# Patient Record
Sex: MALE | Race: WHITE | URBAN - METROPOLITAN AREA
[De-identification: names, ages, dates, MRNs, and addresses within clinical notes are randomized per-mention and may not be internally consistent; named-entity substitution may affect disease eponyms.]

---

## 2021-07-02 ENCOUNTER — THERAPY VISIT (OUTPATIENT)
Dept: PHYSICAL THERAPY | Facility: CLINIC | Age: 41
End: 2021-07-02
Payer: OTHER MISCELLANEOUS

## 2021-07-02 DIAGNOSIS — M79.672 PAIN OF LEFT HEEL: ICD-10-CM

## 2021-07-02 PROCEDURE — 97161 PT EVAL LOW COMPLEX 20 MIN: CPT | Mod: GP | Performed by: PHYSICAL THERAPIST

## 2021-07-02 PROCEDURE — 97035 APP MDLTY 1+ULTRASOUND EA 15: CPT | Mod: GP | Performed by: PHYSICAL THERAPIST

## 2021-07-02 PROCEDURE — 97110 THERAPEUTIC EXERCISES: CPT | Mod: GP | Performed by: PHYSICAL THERAPIST

## 2021-07-02 NOTE — LETTER
[facility] [place]  [address] [address]  [address]  [place] [address]  [phone]    2021    Re: Fernando Good   :   1980  MRN:  [id number]   REFERRING PHYSICIAN:   Arthur BAPTISTE [facility] [place]    Date of Initial Evaluation:  2021  Visits:  Rxs Used: 1  Reason for Referral:  Pain of left heel    EVALUATION SUMMARY    Physical Therapy Initial Evaluation  Subjective:  The history is provided by the patient.   Therapist Generated HPI Evaluation  Problem details: Ladder was falling so he jumped off and took all weight on L heel.  DOI 2021.         Type of problem:  Left foot.  This is a new condition.  Condition occurred with:  A fall/slip.  Where condition occurred: at work.  Patient reports pain:  Sub calcaneus.  Pain is described as aching and sharp and is constant.  Pain radiates to:  Foot and lower leg. Pain is worse during the day and worse in the P.M..    Associated symptoms:  Edema and loss of motion/stiffness. Symptoms are exacerbated by ascending stairs, descending stairs, standing, walking and weight bearing  and relieved by ice.  Special tests included:  X-ray (no fracture).  Restrictions due to condition include:  Working in normal job with restrictions.    Patient Health History  Fernando Good being seen for L heel/ foot stiffness.   Problem began: 2021.   Problem occurred: landed on heel   Pain is reported as 1/10 on pain scale.  General health as reported by patient is fair.  Pertinent medical history includes: high blood pressure and overweight.   Red flags:  None as reported by patient.  Medical allergies: none.   Current medications:  High blood pressure medication.    Current occupation is .   Primary job tasks include:  Prolonged standing and lifting/carrying.   Re: Fernando Good   :   1980                       Objective:    Gait:  Was given boot to  wear also but it actually increased pain in foot.  Arrives with crutches and using only toe WB on L foot.      Ankle/Foot Evaluation  ROM:  AROM is normal (slight end range sx with DF).    Strength is normal.  LIGAMENT TESTING: normal    SPECIAL TESTS: normal    PALPATION: Palpation of ankle: subcalcaneal on L     EDEMA: normal (reports swelling at end of day)    MOBILITY TESTING: normal    General     ROS    Assessment/Plan:    Patient is a 41 year old male with L heel complaints.    Patient has the following significant findings with corresponding treatment plan.                Diagnosis 1:  L heel pain  Pain -  US, manual therapy, self management, education, directional preference exercise and home program  Decreased ROM/flexibility - manual therapy and therapeutic exercise  Impaired gait - gait training  Impaired muscle performance - neuro re-education  Decreased function - therapeutic activities    Therapy Evaluation Codes:   1) History comprised of:   Personal factors that impact the plan of care:      None.    Comorbidity factors that impact the plan of care are:      High blood pressure and Overweight.     Medications impacting care: High blood pressure.  2) Examination of Body Systems comprised of:   Body structures and functions that impact the plan of care:      heel.   Activity limitations that impact the plan of care are:      Walking.  3) Clinical presentation characteristics are:   Stable/Uncomplicated.  4) Decision-Making    Low complexity using standardized patient assessment instrument and/or measureable assessment of functional outcome.  Cumulative Therapy Evaluation is: Low complexity.  Re: Fernando Good   :   1980    Previous and current functional limitations:  (See Goal Flow Sheet for this information)    Short term and Long term goals: (See Goal Flow Sheet for this information)     Communication ability:  Patient appears to be able to clearly communicate and understand verbal  and written communication and follow directions correctly.  Treatment Explanation - The following has been discussed with the patient:   RX ordered/plan of care  Anticipated outcomes  Possible risks and side effects  This patient would benefit from PT intervention to resume normal activities.   Rehab potential is good.    Frequency:  1 X week, once daily  Duration:  for 6 weeks  Discharge Plan:  Achieve all LTG.  Independent in home treatment program.  Reach maximal therapeutic benefit.    Thank you for your referral.    INQUIRIES  Therapist: Chung Blanco PT   [address] [address]  Phone: [phone]  Fax: [phone]

## 2021-07-02 NOTE — PROGRESS NOTES
Physical Therapy Initial Evaluation  Subjective:  The history is provided by the patient.   Therapist Generated HPI Evaluation  Problem details: Ladder was falling so he jumped off and took all weight on L heel.  DOI 6/24/2021.         Type of problem:  Left foot.    This is a new condition.  Condition occurred with:  A fall/slip.  Where condition occurred: at work.  Patient reports pain:  Sub calcaneus.  Pain is described as aching and sharp and is constant.  Pain radiates to:  Foot and lower leg. Pain is worse during the day and worse in the P.M..    Associated symptoms:  Edema and loss of motion/stiffness. Symptoms are exacerbated by ascending stairs, descending stairs, standing, walking and weight bearing  and relieved by ice.  Special tests included:  X-ray (no fracture).    Restrictions due to condition include:  Working in normal job with restrictions.      Patient Health History  Fernando Good being seen for L heel/ foot stiffness.     Problem began: 6/24/2021.   Problem occurred: landed on heel   Pain is reported as 1/10 on pain scale.  General health as reported by patient is fair.  Pertinent medical history includes: high blood pressure and overweight.   Red flags:  None as reported by patient.  Medical allergies: none.       Current medications:  High blood pressure medication.    Current occupation is .   Primary job tasks include:  Prolonged standing and lifting/carrying.                                    Objective:    Gait:  Was given boot to wear also but it actually increased pain in foot.  Arrives with crutches and using only toe WB on L foot.                Ankle/Foot Evaluation  ROM:  AROM is normal (slight end range sx with DF).      Strength is normal.  LIGAMENT TESTING: normal              SPECIAL TESTS: normal    PALPATION: Palpation of ankle: subcalcaneal on L     EDEMA: normal (reports swelling at end of day)          MOBILITY TESTING: normal                                                                 General     ROS    Assessment/Plan:    Patient is a 41 year old male with L heel complaints.    Patient has the following significant findings with corresponding treatment plan.                Diagnosis 1:  L heel pain  Pain -  US, manual therapy, self management, education, directional preference exercise and home program  Decreased ROM/flexibility - manual therapy and therapeutic exercise  Impaired gait - gait training  Impaired muscle performance - neuro re-education  Decreased function - therapeutic activities    Therapy Evaluation Codes:   1) History comprised of:   Personal factors that impact the plan of care:      None.    Comorbidity factors that impact the plan of care are:      High blood pressure and Overweight.     Medications impacting care: High blood pressure.  2) Examination of Body Systems comprised of:   Body structures and functions that impact the plan of care:      heel.   Activity limitations that impact the plan of care are:      Walking.  3) Clinical presentation characteristics are:   Stable/Uncomplicated.  4) Decision-Making    Low complexity using standardized patient assessment instrument and/or measureable assessment of functional outcome.  Cumulative Therapy Evaluation is: Low complexity.    Previous and current functional limitations:  (See Goal Flow Sheet for this information)    Short term and Long term goals: (See Goal Flow Sheet for this information)     Communication ability:  Patient appears to be able to clearly communicate and understand verbal and written communication and follow directions correctly.  Treatment Explanation - The following has been discussed with the patient:   RX ordered/plan of care  Anticipated outcomes  Possible risks and side effects  This patient would benefit from PT intervention to resume normal activities.   Rehab potential is good.    Frequency:  1 X week, once daily  Duration:  for 6 weeks  Discharge Plan:   Achieve all LTG.  Independent in home treatment program.  Reach maximal therapeutic benefit.    Please refer to the daily flowsheet for treatment today, total treatment time and time spent performing 1:1 timed codes.

## 2021-07-09 ENCOUNTER — THERAPY VISIT (OUTPATIENT)
Dept: PHYSICAL THERAPY | Facility: CLINIC | Age: 41
End: 2021-07-09
Payer: OTHER MISCELLANEOUS

## 2021-07-09 DIAGNOSIS — M79.672 PAIN OF LEFT HEEL: ICD-10-CM

## 2021-07-09 PROCEDURE — 97140 MANUAL THERAPY 1/> REGIONS: CPT | Mod: GP | Performed by: PHYSICAL THERAPIST

## 2021-07-09 PROCEDURE — 97035 APP MDLTY 1+ULTRASOUND EA 15: CPT | Mod: GP | Performed by: PHYSICAL THERAPIST

## 2021-07-09 PROCEDURE — 97110 THERAPEUTIC EXERCISES: CPT | Mod: GP | Performed by: PHYSICAL THERAPIST

## 2021-07-12 ENCOUNTER — THERAPY VISIT (OUTPATIENT)
Dept: PHYSICAL THERAPY | Facility: CLINIC | Age: 41
End: 2021-07-12
Payer: OTHER MISCELLANEOUS

## 2021-07-12 DIAGNOSIS — M79.672 PAIN OF LEFT HEEL: ICD-10-CM

## 2021-07-12 PROCEDURE — 97140 MANUAL THERAPY 1/> REGIONS: CPT | Mod: GP

## 2021-07-12 PROCEDURE — 97110 THERAPEUTIC EXERCISES: CPT | Mod: GP

## 2021-07-12 NOTE — LETTER
[facility] [place]  [address]  [address]  [place] [address]  [phone]    November 15, 2021    Re: Fernando Good   :   1980  MRN:  [id number]   REFERRING PHYSICIAN:   Arthur BAPTISTE [facility] [place]    Date of Initial Evaluation:  2021  Visits:  Rxs Used: 3  Reason for Referral:  Pain of left heel    EVALUATION SUMMARY    Subjective:  HPI  Physical Exam                  Objective:  System  Physical Exam  General   ROS    Assessment/Plan:    DISCHARGE REPORT    Progress reporting period is from 2021 to 2021.     SUBJECTIVE  Subjective: pt reports he continues to progress working on getting more WB on left heel    Current Pain level: 1/10   Initial Pain level: 1/10   Changes in function: Yes, see goal flow sheet for change in function    ;   ,     Patient has failed to return to therapy so current objective findings are unknown.  The subjective and objective information are from the last SOAP note on this patient.    OBJECTIVE  Objective: progressing with WB with gait, tenderness continues to decrease       ASSESSMENT/PLAN  Updated problem list and treatment plan: Diagnosis 1:  L heel pain  Pain -  US, manual therapy, self management, education, directional preference exercise  Re: Fernando Good   :   1980    and home program  Decreased ROM/flexibility - manual therapy and therapeutic exercise  Impaired gait - gait training  Impaired muscle performance - neuro re-education  Decreased function - therapeutic activities     STG/LTGs have been met or progress has been made towards goals:  unknown  Assessment of Progress: The patient has not returned to therapy. Current status is unknown.  Self Management Plans:  Patient has been instructed in a home treatment program.    Fernando continues to require the following intervention to meet STG and LTG's: PT intervention is no longer required to meet  STG/LTG.  The patient failed to complete scheduled/ordered appointments so current information is unknown.  We will discharge this patient from PT.    Recommendations:  Canceled and failed last two visits.  Discharge pt from PT.      Thank you for your referral.    INQUIRIES  Therapist: Chanel Ye [address]  [facility] [address]  Phone: [phone]  Fax: [phone]

## 2021-11-15 PROBLEM — M79.672 PAIN OF LEFT HEEL: Status: RESOLVED | Noted: 2021-07-02 | Resolved: 2021-11-15

## 2021-11-15 NOTE — PROGRESS NOTES
Subjective:  HPI  Physical Exam                    Objective:  System    Physical Exam    General     ROS    Assessment/Plan:    DISCHARGE REPORT    Progress reporting period is from 7/2/2021 to 7/12/2021.     SUBJECTIVE  Subjective: pt reports he continues to progress working on getting more WB on left heel    Current Pain level: 1/10   Initial Pain level: 1/10   Changes in function: Yes, see goal flow sheet for change in function    ;   ,     Patient has failed to return to therapy so current objective findings are unknown.  The subjective and objective information are from the last SOAP note on this patient.    OBJECTIVE  Objective: progressing with WB with gait, tenderness continues to decrease       ASSESSMENT/PLAN  Updated problem list and treatment plan: Diagnosis 1:  L heel pain  Pain -  US, manual therapy, self management, education, directional preference exercise and home program  Decreased ROM/flexibility - manual therapy and therapeutic exercise  Impaired gait - gait training  Impaired muscle performance - neuro re-education  Decreased function - therapeutic activities     STG/LTGs have been met or progress has been made towards goals:  unknown  Assessment of Progress: The patient has not returned to therapy. Current status is unknown.  Self Management Plans:  Patient has been instructed in a home treatment program.    Fernando continues to require the following intervention to meet STG and LTG's: PT intervention is no longer required to meet STG/LTG.  The patient failed to complete scheduled/ordered appointments so current information is unknown.  We will discharge this patient from PT.    Recommendations:  Canceled and failed last two visits.  Discharge pt from PT.      Please refer to the daily flowsheet for treatment today, total treatment time and time spent performing 1:1 timed codes.